# Patient Record
Sex: MALE | Race: OTHER | Employment: UNEMPLOYED | URBAN - METROPOLITAN AREA
[De-identification: names, ages, dates, MRNs, and addresses within clinical notes are randomized per-mention and may not be internally consistent; named-entity substitution may affect disease eponyms.]

---

## 2023-01-16 ENCOUNTER — OFFICE VISIT (OUTPATIENT)
Dept: INTERNAL MEDICINE | Age: 23
End: 2023-01-16
Payer: COMMERCIAL

## 2023-01-16 VITALS
TEMPERATURE: 97.9 F | OXYGEN SATURATION: 97 % | DIASTOLIC BLOOD PRESSURE: 78 MMHG | HEIGHT: 68 IN | HEART RATE: 90 BPM | BODY MASS INDEX: 33.65 KG/M2 | SYSTOLIC BLOOD PRESSURE: 110 MMHG | WEIGHT: 222 LBS

## 2023-01-16 DIAGNOSIS — L23.9 ALLERGIC CONTACT DERMATITIS, UNSPECIFIED TRIGGER: Primary | ICD-10-CM

## 2023-01-16 PROCEDURE — 99213 OFFICE O/P EST LOW 20 MIN: CPT | Performed by: NURSE PRACTITIONER

## 2023-01-16 RX ORDER — PREDNISONE 10 MG/1
TABLET ORAL
Qty: 40 TABLET | Refills: 0 | Status: SHIPPED | OUTPATIENT
Start: 2023-01-16

## 2023-01-16 RX ORDER — CLOBETASOL PROPIONATE 0.5 MG/G
CREAM TOPICAL
Qty: 1 EACH | Refills: 0 | Status: SHIPPED | OUTPATIENT
Start: 2023-01-16

## 2023-01-16 ASSESSMENT — PATIENT HEALTH QUESTIONNAIRE - PHQ9
SUM OF ALL RESPONSES TO PHQ QUESTIONS 1-9: 0
1. LITTLE INTEREST OR PLEASURE IN DOING THINGS: 0
SUM OF ALL RESPONSES TO PHQ QUESTIONS 1-9: 0
2. FEELING DOWN, DEPRESSED OR HOPELESS: 0
SUM OF ALL RESPONSES TO PHQ9 QUESTIONS 1 & 2: 0

## 2023-01-16 ASSESSMENT — ENCOUNTER SYMPTOMS
COUGH: 0
SORE THROAT: 0

## 2023-01-16 NOTE — PROGRESS NOTES
[address] [facility] PRIMARY CARE  [address] [place] [id number]  Dept: [phone]  Dept Fax: [phone]  Loc: [phone]     Bubba Terrell 23 y.o. male presents 1/16/23 with   Chief Complaint   Patient presents with    Rash     Poison ivy, on hand and arms, x 1.5 weeks       Rash  This is a new problem. The current episode started 1 to 4 weeks ago. The problem has been gradually worsening since onset. He was exposed to plant contact. Pertinent negatives include no congestion, cough, fever or sore throat. Past treatments include anti-itch cream.     Left hand, right arm, back, waistline. No sign of infection. Denies fever/chills. Was working outside recently. Reviewed the following history:    Past Medical History:   Diagnosis Date    Attention deficit hyperactivity disorder (ADHD), predominantly inattentive type 11/9/2015    Attention deficit hyperactivity disorder (ADHD), predominantly inattentive type 11/9/2015    Migraines      No past surgical history on file. No family history on file. No Known Allergies    No current outpatient medications on file prior to visit. No current facility-administered medications on file prior to visit. Review of Systems   Constitutional:  Negative for chills and fever. HENT:  Negative for congestion and sore throat. Respiratory:  Negative for cough. Skin:  Positive for rash. Objective    Vitals:    01/16/23 1528   BP: 110/78   Pulse: 90   Temp: 97.9 °F (36.6 °C)   TempSrc: Infrared   SpO2: 97%   Weight: 222 lb (100.7 kg)   Height: 5' 8\" (1.727 m)       Physical Exam  Constitutional:       General: He is not in acute distress. Appearance: Normal appearance. He is not ill-appearing or toxic-appearing. HENT:      Head: Normocephalic and atraumatic.       Right Ear: Hearing and external ear normal.      Left Ear: Hearing and external ear normal. Eyes:      General: Lids are normal.      Conjunctiva/sclera: Conjunctivae normal.   Cardiovascular:      Rate and Rhythm: Normal rate and regular rhythm. Heart sounds: Normal heart sounds. Pulmonary:      Effort: Pulmonary effort is normal. No respiratory distress. Breath sounds: Normal breath sounds. No decreased breath sounds, wheezing, rhonchi or rales. Musculoskeletal:      Cervical back: Normal range of motion and neck supple. Skin:     General: Skin is warm and dry. Findings: Erythema and rash present. Rash is vesicular. Comments: Vesicular rash observed to these areas   Neurological:      General: No focal deficit present. Mental Status: He is alert and oriented to person, place, and time. Psychiatric:         Attention and Perception: Attention normal.         Mood and Affect: Mood normal.         Speech: Speech normal.         Behavior: Behavior normal.         Thought Content: Thought content normal.         Cognition and Memory: Cognition normal.         Judgment: Judgment normal.     POC Testing Today: No results found for this visit on 01/16/23. Assessment and Plan    Olegario Sprout 23 y.o. male presenting for rash     1. Allergic contact dermatitis, unspecified trigger  - predniSONE (DELTASONE) 10 MG tablet; Take 4 tablets for 4 days, then 3 tablets for 4 days, then 2 tablets for 4 days, then 1 tablet for 4 days then stop. Dispense: 40 tablet; Refill: 0  - clobetasol (TEMOVATE) 0.05 % cream; Apply topically 2 times daily. Dispense: 1 each; Refill: 0    Procedures:  Unless otherwise noted below, none    Return if symptoms worsen or fail to improve, for follow up. Side effects and adverse effects of any medication prescribed today, as well as treatment plan/rationale, follow-up care, and result expectations have been discussed with the patient. Expresses understanding and desires to proceed with treatment plan.       The patient was reminded that if an antibiotic has been prescribed the predicted course is improvement to cure with no persistent issues. Take antibiotics as directed. If any problems occur, an appointment should be made or ER visit if severe. Because of the risk with ANY antibiotic of C. Difficile colitis if persistent diarrhea or abdominal pain or any concerning symptoms, we should be notified. To reduce this risk, a probiotic pill, yogurt or other preparations containing active cultures should be ingested daily -particularly while on the antibiotic. If any persistent symptoms of illness, follow up appointment should be made in a timely fashion with a physician. Discussed signs and symptoms which require immediate follow-up in ED/call to 911. Understanding verbalized. I have reviewed and updated the electronic medical record.     Jagruti Chapin, LORETA - CNP